# Patient Record
Sex: MALE | Race: WHITE | Employment: FULL TIME | URBAN - NONMETROPOLITAN AREA
[De-identification: names, ages, dates, MRNs, and addresses within clinical notes are randomized per-mention and may not be internally consistent; named-entity substitution may affect disease eponyms.]

---

## 2020-02-19 ENCOUNTER — OFFICE VISIT (OUTPATIENT)
Dept: ONCOLOGY | Age: 55
End: 2020-02-19
Payer: COMMERCIAL

## 2020-02-19 ENCOUNTER — HOSPITAL ENCOUNTER (OUTPATIENT)
Dept: INFUSION THERAPY | Age: 55
Discharge: HOME | End: 2020-02-19
Payer: COMMERCIAL

## 2020-02-19 VITALS
TEMPERATURE: 98.1 F | RESPIRATION RATE: 18 BRPM | DIASTOLIC BLOOD PRESSURE: 52 MMHG | HEART RATE: 71 BPM | BODY MASS INDEX: 30.18 KG/M2 | SYSTOLIC BLOOD PRESSURE: 92 MMHG | HEIGHT: 71 IN | WEIGHT: 215.6 LBS | OXYGEN SATURATION: 97 %

## 2020-02-19 DIAGNOSIS — I82.90 THROMBOSIS: ICD-10-CM

## 2020-02-19 PROCEDURE — 85303 CLOT INHIBIT PROT C ACTIVITY: CPT

## 2020-02-19 PROCEDURE — 81241 F5 GENE: CPT

## 2020-02-19 PROCEDURE — 36415 COLL VENOUS BLD VENIPUNCTURE: CPT

## 2020-02-19 PROCEDURE — 86147 CARDIOLIPIN ANTIBODY EA IG: CPT

## 2020-02-19 PROCEDURE — 99245 OFF/OP CONSLTJ NEW/EST HI 55: CPT | Performed by: INTERNAL MEDICINE

## 2020-02-19 PROCEDURE — 85597 PHOSPHOLIPID PLTLT NEUTRALIZ: CPT

## 2020-02-19 PROCEDURE — 85306 CLOT INHIBIT PROT S FREE: CPT

## 2020-02-19 PROCEDURE — 85613 RUSSELL VIPER VENOM DILUTED: CPT

## 2020-02-19 PROCEDURE — 85670 THROMBIN TIME PLASMA: CPT

## 2020-02-19 PROCEDURE — 85732 THROMBOPLASTIN TIME PARTIAL: CPT

## 2020-02-19 PROCEDURE — 85730 THROMBOPLASTIN TIME PARTIAL: CPT

## 2020-02-19 PROCEDURE — 85610 PROTHROMBIN TIME: CPT

## 2020-02-19 PROCEDURE — 99211 OFF/OP EST MAY X REQ PHY/QHP: CPT

## 2020-02-19 PROCEDURE — 83090 ASSAY OF HOMOCYSTEINE: CPT

## 2020-02-19 RX ORDER — ATORVASTATIN CALCIUM 10 MG/1
TABLET, FILM COATED ORAL
COMMUNITY
Start: 2020-02-12

## 2020-02-19 RX ORDER — ASCORBIC ACID 500 MG
1000 TABLET ORAL DAILY
COMMUNITY

## 2020-02-19 RX ORDER — LEVOTHYROXINE SODIUM 0.1 MG/1
TABLET ORAL
COMMUNITY
Start: 2020-01-10

## 2020-02-19 RX ORDER — LANSOPRAZOLE 30 MG/1
CAPSULE, DELAYED RELEASE ORAL
COMMUNITY
Start: 2019-10-16

## 2020-02-19 RX ORDER — AMLODIPINE BESYLATE AND BENAZEPRIL HYDROCHLORIDE 10; 40 MG/1; MG/1
CAPSULE ORAL
COMMUNITY
Start: 2020-01-10

## 2020-02-21 LAB — HOMOCYSTEINE: 9.1 UMOL/L

## 2020-02-22 LAB
CARDIOLIPIN AB IGM: 12 MPL (ref 0–12)
CARDIOLIPIN ANTIBODY, IGG: 3 GPL (ref 0–14)
DRVVT 1:1 MIX: 63 SEC (ref 33–44)
DRVVT CONFIRMATION TEST: POSITIVE RATIO
DRVVT SCREEN: 84 SEC (ref 33–44)
HEXAGONAL PHOSPHOLIPID NEUTRALIZAT TEST: ABNORMAL
LUPUS ANTICOAG INTERP: ABNORMAL
PLATELET NEUTRALIZATION: POSITIVE
PROTHROMBIN TIME: 20.3 SEC (ref 12–15.5)
PTT 1:1 MIX: 52 SEC (ref 32–48)
PTT LUPUS ANTICOAGULANT: 66 SEC (ref 32–48)
PTT-HEPARIN NEUTRALIZED: ABNORMAL SEC (ref 32–48)
REPTILASE TIME: ABNORMAL SEC
THROMBIN TIME: 16.1 SEC (ref 14.7–19.5)

## 2020-02-23 LAB
PROTEIN C FUNCTIONAL: 174 % (ref 83–168)
PROTEIN S, FUNCTIONAL: 106 % (ref 66–143)

## 2020-02-26 LAB — FACTOR V LEIDEN MUTATION: NORMAL

## 2020-02-27 PROBLEM — I82.811 SUPERFICIAL THROMBOSIS OF RIGHT LOWER EXTREMITY: Status: ACTIVE | Noted: 2020-02-27

## 2020-02-27 PROBLEM — I82.90 THROMBOSIS: Status: ACTIVE | Noted: 2020-02-27

## 2020-02-27 PROBLEM — Z79.01 CHRONIC ANTICOAGULATION: Status: ACTIVE | Noted: 2020-02-27

## 2020-02-27 PROBLEM — Z86.718 HISTORY OF DVT OF LOWER EXTREMITY: Status: ACTIVE | Noted: 2020-02-27

## 2020-02-28 NOTE — PROGRESS NOTES
[facility] CANCER CENTER  CANCER NETWORK OF [facility]  [facility] [address] [facility]  [address]  [address]  Dept: [phone]  Dept Fax: [phone]  Loc: [phone]     Dr. Hi Antonio,  Thank you for your referral of this patient for evaluation of thrombosis. I appreciate your  trust of my care for your patients. I know that you know this patient's history well, but I will summarize for my records. In addition, my recommendations and plan of care are summarized below. Please call if you have any questions or if I can be of any further assistance to you or this patient. Suhail James:      Chief Complaint:  Enmanuel Fragoso is a 54 y.o. with thrombosis. HPI  This is the first visit to the [facility] for this patient who was referred by Aleks Buck M.D. for evaluation of thrombosis. Mr. Jorge Rodriguez is a very pleasant  male with a history of a right lower extremity deep vein thrombosis in April 2017. At that time he presented with associated signs and symptoms of right leg swelling and tenderness. The patient was treated with anticoagulation for approximately 6 months and then anticoagulation was discontinued. Since that diagnosis in 2017 the patient has been wearing compression stockings in the duration of time since then. He had no problems with thrombosis in the duration of time until recently. In December 2019 the patient developed swelling and tenderness of his right lower extremity suspicious of this previous symptoms that he presented with in 2017. However, at that time lower extremity Doppler studies did not find any evidence of thrombosis. The patient's symptoms persisted and became more moderate in severity. On January 10, on 820 the patient had a follow-up Doppler study that did find superficial venous thrombosis of the right lower extremity.   A modifying factor affecting this condition is that the patient has varicose veins. He was restarted on anticoagulation therapy with Xarelto which she continues to take. The patient symptoms have improved but not completely dissipated since starting anticoagulation therapy. In context of this condition, that there is no family history to suggest an underlying hereditary thrombophilia disease. The patient denies chest pain or shortness of breath. He has not had any abnormal bleeding related to anticoagulation. His bowel and bladder habits have been stable. The patient's ECOG performance status is level 0. Past Medical History  He has a past medical history of Hx of blood clots and Hypertension. Surgical History  He has a past surgical history that includes Eye surgery (Right, 1970). Home Medications  He has a current medication list which includes the following prescription(s): amlodipine-benazepril, atorvastatin, lansoprazole, levothyroxine, rivaroxaban, multiple vitamin, aspirin, and vitamin c. Allergies  Allergies   Allergen Reactions    Pcn [Penicillins]      Happened as an infant, not sure if he is still allergic       Social History  He reports that he has never smoked. He has never used smokeless tobacco. He reports current alcohol use. He reports that he does not use drugs. Family History  His family history includes Diabetes in his father and sister. Review of Systems  Constitutional: Negative. HENT: Negative. Eyes: Negative. Respiratory: Negative. Cardiovascular: Negative. Gastrointestinal: Negative. Genitourinary: Negative. Musculoskeletal: Negative. Skin: Negative. Neurological: Negative. Hematological: Negative. Psychiatric/Behavioral: Negative. Objective:   Physical Exam  Vitals:    02/19/20 1418   BP: (!) 92/52   Pulse: 71   Resp: 18   Temp: 98.1 °F (36.7 °C)   SpO2: 97%   Vitals reviewed and are stable. Constitutional: Well-developed and well-nourished.  No acute distress. HENT: Normocephalic and atraumatic. Eyes: Pupils are equal and reactive. No scleral icterus. Neck: Overall appearance is symmetrical. No identifiable masses. Chest: Inspection and palpation of chest is normal.  Pulmonary: Effort normal. No respiratory distress. Cardiovascular: RRR. No edema in any of the four extremities. Abdominal: Soft. No hepatomegaly or splenomegaly. Musculoskeletal: Gait is normal. Muscle strength and tone good. Neurological: Alert and oriented to person, place, and time. Judgment and thought content normal.  Skin: Skin is warm and dry. No rash. Psychiatric: Mood and affect appropriate for the clinical situation. Behavior is normal.      Assessment:   1. Superficial venous thrombosis of the right lower extremity. 2.  History of deep vein thrombosis of the right lower extremity. 3.  Chronic anticoagulation. 4.  Hypotension. Recommendations:   1. The following laboratory studies will be obtained to evaluate for underlying hereditary thrombophilia:   Protein C activity    Protein S activity    Factor V Leiden    Cardiolipin Antibodies IgG & IgM    Homocysteine, Serum   2. The patient will continue anticoagulation with Xarelto at this time. 3.  The length of time of anticoagulation therapy will be tentatively on the above laboratory studies but the patient will continue indefinitely at the present time. 4.  Monitor for abnormal bleeding or bruising secondary to chronic anticoagulation. 5.  Monitor blood pressure and follow-up with primary care provider for further management. Preethi Casey M.D.                                                                          Medical Director: [facility]  Cancer Network [address] [facility], [address] [place] [address], [address] [place] [address]. [facility], 55  [address] of the [facility] at the [facility]      **This report has been created using voice recognition software. It may contain minor errors which are inherent in voice recognition technology. **

## 2020-03-16 ENCOUNTER — TELEPHONE (OUTPATIENT)
Dept: ONCOLOGY | Age: 55
End: 2020-03-16

## 2020-03-17 NOTE — TELEPHONE ENCOUNTER
His lab tests were generally good but 1 was equivocal.  I would recommend that we repeat that lab test in 12 weeks for repeat evaluation. Patient can schedule a follow-up at that time.

## 2020-05-18 ENCOUNTER — OFFICE VISIT (OUTPATIENT)
Dept: ONCOLOGY | Age: 55
End: 2020-05-18
Payer: COMMERCIAL

## 2020-05-18 ENCOUNTER — HOSPITAL ENCOUNTER (OUTPATIENT)
Dept: INFUSION THERAPY | Age: 55
Discharge: HOME | End: 2020-05-18
Payer: COMMERCIAL

## 2020-05-18 VITALS
HEIGHT: 71 IN | OXYGEN SATURATION: 97 % | TEMPERATURE: 98.8 F | SYSTOLIC BLOOD PRESSURE: 115 MMHG | RESPIRATION RATE: 18 BRPM | DIASTOLIC BLOOD PRESSURE: 74 MMHG | WEIGHT: 216 LBS | BODY MASS INDEX: 30.24 KG/M2 | HEART RATE: 81 BPM

## 2020-05-18 PROCEDURE — 99211 OFF/OP EST MAY X REQ PHY/QHP: CPT

## 2020-05-18 PROCEDURE — 99213 OFFICE O/P EST LOW 20 MIN: CPT | Performed by: INTERNAL MEDICINE

## 2020-09-21 ENCOUNTER — OFFICE VISIT (OUTPATIENT)
Dept: ONCOLOGY | Age: 55
End: 2020-09-21
Payer: COMMERCIAL

## 2020-09-21 ENCOUNTER — HOSPITAL ENCOUNTER (OUTPATIENT)
Dept: INFUSION THERAPY | Age: 55
Discharge: HOME | End: 2020-09-21
Payer: COMMERCIAL

## 2020-09-21 VITALS
WEIGHT: 224 LBS | DIASTOLIC BLOOD PRESSURE: 84 MMHG | TEMPERATURE: 97.5 F | BODY MASS INDEX: 31.36 KG/M2 | RESPIRATION RATE: 18 BRPM | OXYGEN SATURATION: 99 % | HEIGHT: 71 IN | SYSTOLIC BLOOD PRESSURE: 137 MMHG | HEART RATE: 80 BPM

## 2020-09-21 DIAGNOSIS — I82.90 THROMBOSIS: ICD-10-CM

## 2020-09-21 LAB
ABSOLUTE IMMATURE GRANULOCYTE: 0.02 THOU/MM3 (ref 0–0.07)
BASINOPHIL, AUTOMATED: 1 % (ref 0–3)
BASOPHILS ABSOLUTE: 0.1 THOU/MM3 (ref 0–0.1)
EOSINOPHILS ABSOLUTE: 0.3 THOU/MM3 (ref 0–0.4)
EOSINOPHILS RELATIVE PERCENT: 5 % (ref 0–4)
HCT VFR BLD CALC: 46.6 % (ref 42–52)
HEMOGLOBIN: 15.7 GM/DL (ref 14–18)
IMMATURE GRANULOCYTES: 0 %
LYMPHOCYTES # BLD: 29 % (ref 15–47)
LYMPHOCYTES ABSOLUTE: 1.7 THOU/MM3 (ref 1–4.8)
MCH RBC QN AUTO: 30.8 PG (ref 26–33)
MCHC RBC AUTO-ENTMCNC: 33.7 GM/DL (ref 32.2–35.5)
MCV RBC AUTO: 92 FL (ref 80–94)
MONOCYTES ABSOLUTE: 0.7 THOU/MM3 (ref 0.4–1.3)
MONOCYTES: 12 % (ref 0–12)
PDW BLD-RTO: 12.8 % (ref 11.5–14.5)
PLATELET # BLD: 236 THOU/MM3 (ref 130–400)
PMV BLD AUTO: 10.6 FL (ref 9.4–12.4)
RBC # BLD: 5.09 MILL/MM3 (ref 4.7–6.1)
SEG NEUTROPHILS: 53 % (ref 43–75)
SEGMENTED NEUTROPHILS ABSOLUTE COUNT: 3.2 THOU/MM3 (ref 1.8–7.7)
WBC # BLD: 6 THOU/MM3 (ref 4.8–10.8)

## 2020-09-21 PROCEDURE — 99213 OFFICE O/P EST LOW 20 MIN: CPT | Performed by: INTERNAL MEDICINE

## 2020-09-21 PROCEDURE — 99211 OFF/OP EST MAY X REQ PHY/QHP: CPT

## 2020-09-21 PROCEDURE — 85613 RUSSELL VIPER VENOM DILUTED: CPT

## 2020-09-21 PROCEDURE — 36415 COLL VENOUS BLD VENIPUNCTURE: CPT

## 2020-09-21 PROCEDURE — 85730 THROMBOPLASTIN TIME PARTIAL: CPT

## 2020-09-21 PROCEDURE — 85610 PROTHROMBIN TIME: CPT

## 2020-09-21 PROCEDURE — 85025 COMPLETE CBC W/AUTO DIFF WBC: CPT

## 2020-09-21 NOTE — PROGRESS NOTES
[facility] CANCER CENTER  CANCER NETWORK OF [facility]  [facility] [address] [facility] 98  [address]  [address]  Dept: [phone]  Dept Fax: [phone]  Loc: [phone]     Subjective:      Chief Complaint:  Bladimir Reyes is a 54 y.o. with thrombosis. Mr. Phil Vaughn is a very pleasant  male with a history of a right lower extremity deep vein thrombosis in April 2017. At that time he presented with associated signs and symptoms of right leg swelling and tenderness. The patient was treated with anticoagulation for approximately 6 months and then anticoagulation was discontinued. Since that diagnosis in 2017 the patient has been wearing compression stockings in the duration of time since then. He had no problems with thrombosis in the duration of time until December 2019. At that time,  the patient developed swelling and tenderness of his right lower extremity suspicious of this previous symptoms that he presented with in 2017. However, at that time lower extremity Doppler studies did not find any evidence of thrombosis. The patient's symptoms persisted and became more severe. On January 10, 2020,  the patient had a follow-up Doppler study that did find superficial venous thrombosis of the right lower extremity. A modifying factor affecting this condition is that the patient has varicose veins. He was restarted on anticoagulation therapy with Xarelto    HPI:     Avis Lo returns today for follow-up regarding his history of thrombosis. His general sense of wellbeing has been good. He has had no signs or symptoms that would be suggestive of recurrence of thrombosis or pulmonary emboli. The patient denies having any significant lower extremity swelling. He has stopped the use of Xarelto approximately 2 months ago. The patient has felt no difference in his wellbeing since being off Xarelto.  He has not had fever, Medical Director: [facility]  [facility]  [address], 1 [facility], [address] [place] [address], [address] [place] [address], [address] of the [facility] at [facility]      **This report has been created using voice recognition software. It may contain minor errors which are inherent in voice recognition technology. **

## 2020-09-24 LAB
DRVVT 1:1 MIX: NORMAL SEC (ref 33–44)
DRVVT CONFIRMATION TEST: NORMAL RATIO
DRVVT SCREEN: 34 SEC (ref 33–44)
HEXAGONAL PHOSPHOLIPID NEUTRALIZAT TEST: NORMAL
LUPUS ANTICOAG INTERP: NORMAL
PLATELET NEUTRALIZATION: NORMAL
PROTHROMBIN TIME: 12.3 SEC (ref 12–15.5)
PTT 1:1 MIX: NORMAL SEC (ref 32–48)
PTT LUPUS ANTICOAGULANT: 41 SEC (ref 32–48)
PTT-HEPARIN NEUTRALIZED: NORMAL SEC (ref 32–48)
REPTILASE TIME: NORMAL SEC
THROMBIN TIME: NORMAL SEC (ref 14.7–19.5)

## 2021-03-22 ENCOUNTER — OFFICE VISIT (OUTPATIENT)
Dept: ONCOLOGY | Age: 56
End: 2021-03-22
Payer: COMMERCIAL

## 2021-03-22 ENCOUNTER — HOSPITAL ENCOUNTER (OUTPATIENT)
Dept: INFUSION THERAPY | Age: 56
Discharge: HOME | End: 2021-03-22
Payer: COMMERCIAL

## 2021-03-22 VITALS
HEIGHT: 71 IN | RESPIRATION RATE: 16 BRPM | OXYGEN SATURATION: 97 % | BODY MASS INDEX: 31.61 KG/M2 | SYSTOLIC BLOOD PRESSURE: 138 MMHG | HEART RATE: 71 BPM | TEMPERATURE: 98 F | WEIGHT: 225.8 LBS | DIASTOLIC BLOOD PRESSURE: 71 MMHG

## 2021-03-22 DIAGNOSIS — I82.90 THROMBOSIS: ICD-10-CM

## 2021-03-22 DIAGNOSIS — Z86.718 HISTORY OF DVT OF LOWER EXTREMITY: ICD-10-CM

## 2021-03-22 DIAGNOSIS — I82.90 THROMBOSIS: Primary | ICD-10-CM

## 2021-03-22 LAB
ABSOLUTE IMMATURE GRANULOCYTE: 0.01 THOU/MM3 (ref 0–0.07)
BASINOPHIL, AUTOMATED: 1 % (ref 0–3)
BASOPHILS ABSOLUTE: 0 THOU/MM3 (ref 0–0.1)
EOSINOPHILS ABSOLUTE: 0.4 THOU/MM3 (ref 0–0.4)
EOSINOPHILS RELATIVE PERCENT: 7 % (ref 0–4)
HCT VFR BLD CALC: 46.6 % (ref 42–52)
HEMOGLOBIN: 15.6 GM/DL (ref 14–18)
IMMATURE GRANULOCYTES: 0 %
LYMPHOCYTES # BLD: 26 % (ref 15–47)
LYMPHOCYTES ABSOLUTE: 1.5 THOU/MM3 (ref 1–4.8)
MCH RBC QN AUTO: 31 PG (ref 26–33)
MCHC RBC AUTO-ENTMCNC: 33.5 GM/DL (ref 32.2–35.5)
MCV RBC AUTO: 93 FL (ref 80–94)
MONOCYTES ABSOLUTE: 0.7 THOU/MM3 (ref 0.4–1.3)
MONOCYTES: 12 % (ref 0–12)
PDW BLD-RTO: 12.2 % (ref 11.5–14.5)
PLATELET # BLD: 253 THOU/MM3 (ref 130–400)
PMV BLD AUTO: 10.2 FL (ref 9.4–12.4)
RBC # BLD: 5.03 MILL/MM3 (ref 4.7–6.1)
SEG NEUTROPHILS: 55 % (ref 43–75)
SEGMENTED NEUTROPHILS ABSOLUTE COUNT: 3 THOU/MM3 (ref 1.8–7.7)
WBC # BLD: 5.5 THOU/MM3 (ref 4.8–10.8)

## 2021-03-22 PROCEDURE — 85730 THROMBOPLASTIN TIME PARTIAL: CPT

## 2021-03-22 PROCEDURE — 36415 COLL VENOUS BLD VENIPUNCTURE: CPT

## 2021-03-22 PROCEDURE — 85610 PROTHROMBIN TIME: CPT

## 2021-03-22 PROCEDURE — 99211 OFF/OP EST MAY X REQ PHY/QHP: CPT

## 2021-03-22 PROCEDURE — 85613 RUSSELL VIPER VENOM DILUTED: CPT

## 2021-03-22 PROCEDURE — 99213 OFFICE O/P EST LOW 20 MIN: CPT | Performed by: INTERNAL MEDICINE

## 2021-03-22 PROCEDURE — 85025 COMPLETE CBC W/AUTO DIFF WBC: CPT

## 2021-03-22 NOTE — PROGRESS NOTES
[facility] CANCER CENTER  CANCER NETWORK OF [facility]  [facility] [address] [facility] 98  [address]  [address]  Dept: [phone]  Dept Fax: [phone]  Loc: [phone]     Subjective:      Chief Complaint:  Saúl Tian is a 64 y.o. with thrombosis. Mr. Jcarlos Wilson is a very pleasant  male with a history of a right lower extremity deep vein thrombosis in April 2017. At that time he presented with associated signs and symptoms of right leg swelling and tenderness. The patient was treated with anticoagulation for approximately 6 months and then anticoagulation was discontinued. Since that diagnosis in 2017 the patient has been wearing compression stockings in the duration of time since then. He had no problems with thrombosis in the duration of time until December 2019. At that time,  the patient developed swelling and tenderness of his right lower extremity suspicious of this previous symptoms that he presented with in 2017. However, at that time lower extremity Doppler studies did not find any evidence of thrombosis. The patient's symptoms persisted and became more severe. On January 10, 2020,  the patient had a follow-up Doppler study that did find superficial venous thrombosis of the right lower extremity. A modifying factor affecting this condition is that the patient has varicose veins. He was restarted on anticoagulation therapy with Xarelto    HPI:     The patient returns today for follow-up regarding his history of thrombosis. On today's evaluation he reports no signs or symptoms that would be suggestive of recurrence of thrombotic disease. The patient currently is not on therapy with anticoagulation. A lupus anticoagulant test completed today was noted to be not detected. He has not had fever, cough, shortness of breath or other signs of infection.   The patient's bowel and bladder habits have been normal.  He has not seen blood in his stool or urine. The patient remains active with an ECOG performance status of level 0. PMH, SH, and FH:  I reviewed the patients medication list and allergy list as noted on the electronic medical record. The PMH, SH and FH were also reviewed as noted on the EMR. Review of Systems  Constitutional: Negative. HENT: Negative. Eyes: Negative. Respiratory: Negative. Cardiovascular: Negative. Gastrointestinal: Negative. Genitourinary: Negative. Musculoskeletal: Negative. Skin: Negative. Neurological: Negative. Hematological: Negative. Psychiatric/Behavioral: Negative. Objective:   Physical Exam  Vitals:    03/22/21 1054   BP: 138/71   Pulse: 71   Resp: 16   Temp: 98 °F (36.7 °C)   SpO2: 97%   Vitals reviewed and are stable. Constitutional: Well-developed. No acute distress. HENT: Normocephalic and atraumatic. Eyes: Pupils appear equal and reactive. Neck: Overall appearance is symmetrical. No identifiable masses. Pulmonary: Effort normal. No respiratory distress. .  Neurological: Alert and oriented to person, place, and time. Judgment and thought content normal.  Skin: Skin is warm and dry. No rash. Psychiatric: Mood and affect appropriate for the clinical situation. Assessment:   1. Superficial venous thrombosis of the right lower extremity. 2.  History of deep vein thrombosis of the right lower extremity. Recommendations:   1. Monitor for recurrence of thrombosis. Multiple questions were answered throughout the course the consultation. By the end of the consultation, all the patient's questions had been answered. The patient will follow up with his primary care provider for evaluation and return to the hematology clinic as needed. Juancarlos Krishnamurthy M.D.                                                                          Medical Director: [facility] [address], [address], [address] [place] [address], [address] [place] [address] of the [facility] at [facility]      **This report has been created using voice recognition software. It may contain minor errors which are inherent in voice recognition technology. **

## 2021-03-25 LAB
DRVVT 1:1 MIX: ABNORMAL SEC (ref 33–44)
DRVVT CONFIRMATION TEST: ABNORMAL RATIO
DRVVT SCREEN: 31 SEC (ref 33–44)
HEXAGONAL PHOSPHOLIPID NEUTRALIZAT TEST: ABNORMAL
LUPUS ANTICOAG INTERP: ABNORMAL
PLATELET NEUTRALIZATION: ABNORMAL
PROTHROMBIN TIME: 12.7 SEC (ref 12–15.5)
PTT 1:1 MIX: ABNORMAL SEC (ref 32–48)
PTT LUPUS ANTICOAGULANT: 41 SEC (ref 32–48)
PTT-HEPARIN NEUTRALIZED: ABNORMAL SEC (ref 32–48)
REPTILASE TIME: ABNORMAL SEC
THROMBIN TIME: ABNORMAL SEC (ref 14.7–19.5)